# Patient Record
Sex: FEMALE | Race: WHITE | URBAN - METROPOLITAN AREA
[De-identification: names, ages, dates, MRNs, and addresses within clinical notes are randomized per-mention and may not be internally consistent; named-entity substitution may affect disease eponyms.]

---

## 2022-07-09 ENCOUNTER — TELEPHONE ENCOUNTER (OUTPATIENT)
Dept: URBAN - METROPOLITAN AREA CLINIC 121 | Facility: CLINIC | Age: 57
End: 2022-07-09

## 2022-07-10 ENCOUNTER — TELEPHONE ENCOUNTER (OUTPATIENT)
Dept: URBAN - METROPOLITAN AREA CLINIC 121 | Facility: CLINIC | Age: 57
End: 2022-07-10

## 2022-07-10 RX ORDER — OMEPRAZOLE 40 MG/1
TAKE AN HOUR BEFORE BREAKFAST ONCE A DAY CAPSULE, DELAYED RELEASE ORAL
Refills: 4 | Status: ACTIVE | COMMUNITY
Start: 2009-05-08

## 2022-08-17 ENCOUNTER — DASHBOARD ENCOUNTERS (OUTPATIENT)
Age: 57
End: 2022-08-17

## 2022-08-17 ENCOUNTER — OFFICE VISIT (OUTPATIENT)
Dept: URBAN - METROPOLITAN AREA CLINIC 7 | Facility: CLINIC | Age: 57
End: 2022-08-17
Payer: COMMERCIAL

## 2022-08-17 ENCOUNTER — TELEPHONE ENCOUNTER (OUTPATIENT)
Dept: URBAN - METROPOLITAN AREA CLINIC 7 | Facility: CLINIC | Age: 57
End: 2022-08-17

## 2022-08-17 VITALS
SYSTOLIC BLOOD PRESSURE: 118 MMHG | BODY MASS INDEX: 22.11 KG/M2 | WEIGHT: 137.6 LBS | TEMPERATURE: 98 F | HEIGHT: 66 IN | DIASTOLIC BLOOD PRESSURE: 64 MMHG

## 2022-08-17 DIAGNOSIS — C34.90 MALIGNANT NEOPLASM OF LUNG, UNSPECIFIED LATERALITY, UNSPECIFIED PART OF LUNG: ICD-10-CM

## 2022-08-17 DIAGNOSIS — C71.9 MALIGNANT NEOPLASM OF BRAIN, UNSPECIFIED LOCATION: ICD-10-CM

## 2022-08-17 DIAGNOSIS — K86.9 PANCREATIC LESION: ICD-10-CM

## 2022-08-17 DIAGNOSIS — Z87.19 HISTORY OF PANCREATITIS: ICD-10-CM

## 2022-08-17 PROBLEM — 85921000119107: Status: ACTIVE | Noted: 2022-08-17

## 2022-08-17 PROBLEM — 363358000: Status: ACTIVE | Noted: 2022-08-17

## 2022-08-17 PROBLEM — 3855007: Status: ACTIVE | Noted: 2022-08-17

## 2022-08-17 PROBLEM — 428061005: Status: ACTIVE | Noted: 2022-08-17

## 2022-08-17 PROBLEM — 372064008: Status: ACTIVE | Noted: 2022-08-17

## 2022-08-17 PROCEDURE — 99244 OFF/OP CNSLTJ NEW/EST MOD 40: CPT | Performed by: STUDENT IN AN ORGANIZED HEALTH CARE EDUCATION/TRAINING PROGRAM

## 2022-08-17 PROCEDURE — 99204 OFFICE O/P NEW MOD 45 MIN: CPT | Performed by: STUDENT IN AN ORGANIZED HEALTH CARE EDUCATION/TRAINING PROGRAM

## 2022-08-17 RX ORDER — OMEPRAZOLE 40 MG/1
TAKE AN HOUR BEFORE BREAKFAST ONCE A DAY CAPSULE, DELAYED RELEASE ORAL
Refills: 4 | Status: DISCONTINUED | COMMUNITY
Start: 2009-05-08

## 2022-08-17 NOTE — HPI-TODAY'S VISIT:
Patient has a history of breast ca dx 2016 (lumpectomy and XRT), brain cancer (dx 3/2022, 5 sessions of radiation with chemo, with recurrence recently, now , lung mass (SCC of lung), pancreatic lesion, metastatic disease to brain on decadron?, seizures, hx of pancreatis, who presents for evaluation of pancreatic lesion.  Dr Neumann: [facility]  Imaging/Studies/Procedures: Lipase	1,989 (H) CA 19-9	23.8  CT A/P 7/2022: *  Acute pancreatitis. *  Reidentified mass in the pancreatic neck and body junction measuring 3.6 x 2.4 cm, larger compared to prior. This is suspicious for primary pancreatic malignancy versus metastatic disease. *  Partially visualized mass in the inferomedial right lower lobe.  MRI brain: FINDINGS: A new rim-enhancing mass is identified in the right postcentral gyrus at the vertex, measuring 9 mm in diameter on axial postcontrast image 22. There is associated vasogenic edema and mild local mass effect. An additional new rim-enhancing lesion is identified in the left postcentral gyrus, measuring 8 mm in diameter on axial postcontrast image 20, with milder associated vasogenic edema. Reference lesions in the left centrum semiovale measuring 1.0 x 0.9 cm and axial postcontrast image 18 and in the right periatrial white matter measuring 8 x 5 mm on axial postcontrast image 15 have not significantly changed. There is residual faint ill-defined enhancement in the left frontal white matter on axial postcontrast. Enhancing lesions cerebellar hemispheres have decreased, the largest measuring 5 mm and axial postcontrast image 4, previously 0.1 cm. Lesions with intrinsic increased T1 signal adjacent to the splenium of the corpus callosum (2.1 x 1.7 cm on T1 image 17) and in the left temporal lobe (9 x 4 mm and axial T1 image 11) are smaller. Additional areas of spotty increased T2 and FLAIR signal are present bilaterally, without appreciable enhancement identified at this time. The ventricles are normal in size. No midline shift. Arterial intracranial flow voids appear patent. No restricted diffusion. The visualized orbits are unremarkable. Visualized paranasal sinuses and mastoid air cells are aerated.  Had extensive discussion with patient regarding risks and benefits of EUS/FNA. The lesion in the pancreas could either be metastatic from other cancers (brain/lung/breast) or it can be a separate primary. If metastatic, the EUS, would provide little benefit unless indicated by oncology. The patient would like to avoid and really didnt even want to undergo EUS/FNA even if there was a clear indication. The patient would like to talk it over with her oncologist and I will also reach out as well to Dr Lawson.